# Patient Record
Sex: FEMALE | Race: WHITE | NOT HISPANIC OR LATINO | URBAN - METROPOLITAN AREA
[De-identification: names, ages, dates, MRNs, and addresses within clinical notes are randomized per-mention and may not be internally consistent; named-entity substitution may affect disease eponyms.]

---

## 2024-03-30 ENCOUNTER — EMERGENCY (EMERGENCY)
Facility: HOSPITAL | Age: 74
LOS: 1 days | Discharge: HOME | End: 2024-03-30
Attending: EMERGENCY MEDICINE | Admitting: EMERGENCY MEDICINE
Payer: MEDICAID

## 2024-03-30 VITALS
DIASTOLIC BLOOD PRESSURE: 85 MMHG | RESPIRATION RATE: 16 BRPM | HEART RATE: 78 BPM | SYSTOLIC BLOOD PRESSURE: 128 MMHG | WEIGHT: 154.32 LBS | OXYGEN SATURATION: 98 % | TEMPERATURE: 98 F | HEIGHT: 63.78 IN

## 2024-03-30 PROCEDURE — 99284 EMERGENCY DEPT VISIT MOD MDM: CPT

## 2024-03-30 RX ORDER — SODIUM CHLORIDE 9 MG/ML
1000 INJECTION INTRAMUSCULAR; INTRAVENOUS; SUBCUTANEOUS ONCE
Refills: 0 | Status: COMPLETED | OUTPATIENT
Start: 2024-03-30 | End: 2024-03-30

## 2024-03-30 RX ORDER — IBUPROFEN 200 MG
400 TABLET ORAL ONCE
Refills: 0 | Status: DISCONTINUED | OUTPATIENT
Start: 2024-03-30 | End: 2024-04-03

## 2024-03-30 RX ORDER — ONDANSETRON 8 MG/1
4 TABLET, FILM COATED ORAL ONCE
Refills: 0 | Status: COMPLETED | OUTPATIENT
Start: 2024-03-30 | End: 2024-03-30

## 2024-03-30 RX ORDER — ACETAMINOPHEN 500 MG
975 TABLET ORAL ONCE
Refills: 0 | Status: DISCONTINUED | OUTPATIENT
Start: 2024-03-30 | End: 2024-04-03

## 2024-03-30 NOTE — ED ADULT NURSE NOTE - NSFALLRISK_ED_ALL_ED
PT came from [facility] ED to [facility] on this date with the following items:    t-shirt x 22  Pants x 5  Pajama pants x 3  Pairs of socks x 12  Unmatched sock x 1  Sweaters x 7  Underwear  Pair of shoes x 2  Sleeveless shirt x 2  Bag of papers/documents  Shorts x 2   Pack of cigarettes (10 individual)  Lighters x 2  Clothes hangers x 20  Duffle bag x 1  $5 13 USD  Wallet x 1  PA DL x 1  SSN card x 1  [employer] card x 1  Visa debit card x 1  Business&Gift cards    Items given to the PT to have on the unit:  t-shirt x 3  Pants x 3  Pajama pants x 1  Underwear x 2  Pair of socks x 4  Sweater x 1  Pair of shoes x 1        Items sent to [employer] on the Unit:   t-shirt x 19  Sleeveless shirt x 2  Pants x 2  Pajama pants x 2  Bag of Papers/Documents x 1  Shorts x 2  Pair of socks x 8  1 unmatched socks x 1  Sweates x 6  Pack of cigarettes (10 individual)  Lighters x 2  Clothing Hangers x 20  Dufflebag x 1          Items sent to [facility] in [facility] #[id number]:  $5 13 USD  Pa DL x 1  SSN Card x 1  [employer] card x 1  Visa Debit Card x 1  Business&Gift Cards No

## 2024-03-30 NOTE — ED PROVIDER NOTE - PATIENT PORTAL LINK FT
You can access the FollowMyHealth Patient Portal offered by [facility] by registering at the following website: [email or web address]. By joining "[employer]’s FollowMyHealth portal, you will also be able to view your health information using other applications (apps) compatible with our system.

## 2024-03-30 NOTE — ED PROVIDER NOTE - CLINICAL SUMMARY MEDICAL DECISION MAKING FREE TEXT BOX
73-year-old female presenting with a single episode of nausea vomiting and diarrhea today associated with some diffuse abdominal pains.  Nausea has completely resolved and her abdominal pain is decreasing.  We initially planned to do an IV, UA and CT of the abdomen and pelvis to exclude diverticulitis or similar.  Patient however declined stating that she prefers to try conservative management with acetaminophen and ibuprofen as she feels overall better.  I gave her strict return precautions to come back if anything gets worse.  Shared decision-making was done with both her and her son.  She elected to use him as her  and understood much of  the conversation.

## 2024-03-30 NOTE — ED ADULT NURSE NOTE - OBJECTIVE STATEMENT
72 y/o F presenting to ED c/o a few days of abdominal pain. Denies nausea/vomiting. One episode of watery BM this morning. No medical hx, and denies taking medication regularly

## 2024-03-30 NOTE — ED PROVIDER NOTE - PHYSICAL EXAMINATION
VITAL SIGNS: I have reviewed nursing notes and confirm.   CONST: Well-developed; well-nourished; No apparent distress.  ENT: No nasal discharge; airway clear.  HEAD: Normocephalic; atraumatic.  EYES: Sclera clear. Pupils round and symmetrical bilaterally.  ABD: Soft; diffuse mild abdominal tenderness; no rebound or guarding.  MSK: No acute deformities noted to extremities.   NEURO: Alert, oriented. Speech is fluent and appropriate. Moving all extremities appropriately.   SKIN: Skin is normal temperature; no diaphoresis; no pallor.   PSYCH: Cooperative. Appropriate mood, language, and behavior.

## 2024-03-30 NOTE — ED PROVIDER NOTE - NSFOLLOWUPINSTRUCTIONS_ED_ALL_ED_FT
Please have a low threshold to come back to the emergency department if your abdominal pain gets any worse or you develop any other worrisome symptoms such as increased diarrhea fever or chills.  Please follow a bland diet and drink plenty of liquids such as coconut water water or broth.

## 2024-03-30 NOTE — ED PROVIDER NOTE - OBJECTIVE STATEMENT
73-year-old female brought in by her son for evaluation of a single episode of nausea and vomiting and diarrhea.  She had some abdominal pains which have since decreased significantly.  She was sent in for evaluation by urgent care.  She has had no fevers and chills and has no history of diverticulitis.  She also has no sick contacts.

## 2024-03-30 NOTE — ED ADULT NURSE REASSESSMENT NOTE - NS ED NURSE REASSESS COMMENT FT1
Attempted to initiated IV and administer meds. MD at bedside assessing patient, declined meds and further workup. Requested tylenol and ibuprofen, not at the assigned spot, left before being able to administer medication.

## 2024-04-02 DIAGNOSIS — R11.2 NAUSEA WITH VOMITING, UNSPECIFIED: ICD-10-CM

## 2024-04-02 DIAGNOSIS — R10.84 GENERALIZED ABDOMINAL PAIN: ICD-10-CM

## 2024-04-02 DIAGNOSIS — Z88.4 ALLERGY STATUS TO ANESTHETIC AGENT: ICD-10-CM

## 2024-04-02 DIAGNOSIS — R19.7 DIARRHEA, UNSPECIFIED: ICD-10-CM

## 2024-07-05 NOTE — ED ADULT NURSE NOTE - ALCOHOL PRE SCREEN (AUDIT - C)
"Oncology Rooming Note    July 5, 2024 11:11 AM   Sienna Bennett is a 73 year old female who presents for:    Chief Complaint   Patient presents with    Oncology Clinic Visit     Return seeing Dr. Gillis     Initial Vitals: /58 (BP Location: Left arm, Patient Position: Sitting, Cuff Size: Adult Regular)   Pulse 99   Temp 97.6  F (36.4  C) (Oral)   Wt 52.1 kg (114 lb 14.4 oz)   SpO2 97%   BMI 21.02 kg/m   Estimated body mass index is 21.02 kg/m  as calculated from the following:    Height as of 5/23/24: 1.575 m (5' 2\").    Weight as of this encounter: 52.1 kg (114 lb 14.4 oz). Body surface area is 1.51 meters squared.  Mild Pain (2) Comment: Data Unavailable   No LMP recorded. Patient is postmenopausal.  Allergies reviewed: Yes  Medications reviewed: Yes    Medications: Medication refills not needed today.  Pharmacy name entered into [facility]: [id number] [place] [address]    Frailty Screening:   Is the patient here for a new oncology consult visit in cancer care? 2. No      Clinical concerns: Return  Dr. Gillis was notified.      Anali Alejandro             "
Statement Selected

## 2025-01-28 NOTE — ED ADULT NURSE NOTE - JUGULAR VENOUS DISTENTION
Patient and family both been reporting intermittent speech disturbance.  Initially patient's  described patient having difficulty words out this morning.  This was not witnessed by staff.  On exam at bedside, patient did not have any expressive aphasia; able to name objects, repeat phrases, no loss of fluency.  Called back to the bedside this afternoon because sister was concerned about slurred speech.  I evaluated patient.  Patient does have mild dysarthria but speech is understandable.  She appears fatigued and admits to feeling tired.  We discussed how stroke symptoms can become more pronounced when patients are tired.  Patient otherwise clinically stable.    VIKTORIA Sroto - CNP  Neuro Critical Care  01/28/25 6:27 PM    
absent